# Patient Record
Sex: FEMALE | Race: WHITE | NOT HISPANIC OR LATINO
[De-identification: names, ages, dates, MRNs, and addresses within clinical notes are randomized per-mention and may not be internally consistent; named-entity substitution may affect disease eponyms.]

---

## 2024-02-27 PROBLEM — Z00.00 ENCOUNTER FOR PREVENTIVE HEALTH EXAMINATION: Status: ACTIVE | Noted: 2024-02-27

## 2024-03-25 ENCOUNTER — APPOINTMENT (OUTPATIENT)
Dept: CARDIOLOGY | Facility: CLINIC | Age: 79
End: 2024-03-25
Payer: MEDICARE

## 2024-03-25 VITALS
RESPIRATION RATE: 14 BRPM | WEIGHT: 110 LBS | DIASTOLIC BLOOD PRESSURE: 80 MMHG | BODY MASS INDEX: 20.24 KG/M2 | SYSTOLIC BLOOD PRESSURE: 168 MMHG | HEIGHT: 62 IN | HEART RATE: 85 BPM | OXYGEN SATURATION: 99 %

## 2024-03-25 VITALS — SYSTOLIC BLOOD PRESSURE: 144 MMHG | DIASTOLIC BLOOD PRESSURE: 62 MMHG

## 2024-03-25 DIAGNOSIS — Z78.9 OTHER SPECIFIED HEALTH STATUS: ICD-10-CM

## 2024-03-25 DIAGNOSIS — Z82.49 FAMILY HISTORY OF ISCHEMIC HEART DISEASE AND OTHER DISEASES OF THE CIRCULATORY SYSTEM: ICD-10-CM

## 2024-03-25 DIAGNOSIS — Z86.59 PERSONAL HISTORY OF OTHER MENTAL AND BEHAVIORAL DISORDERS: ICD-10-CM

## 2024-03-25 DIAGNOSIS — E11.9 TYPE 2 DIABETES MELLITUS W/OUT COMPLICATIONS: ICD-10-CM

## 2024-03-25 PROCEDURE — 99205 OFFICE O/P NEW HI 60 MIN: CPT | Mod: 25

## 2024-03-25 PROCEDURE — 93000 ELECTROCARDIOGRAM COMPLETE: CPT

## 2024-03-25 RX ORDER — ASPIRIN 81 MG
81 TABLET, DELAYED RELEASE (ENTERIC COATED) ORAL DAILY
Refills: 0 | Status: ACTIVE | COMMUNITY

## 2024-03-25 RX ORDER — LISINOPRIL 10 MG/1
10 TABLET ORAL DAILY
Refills: 0 | Status: ACTIVE | COMMUNITY

## 2024-03-25 RX ORDER — INSULIN GLARGINE 100 [IU]/ML
100 INJECTION, SOLUTION SUBCUTANEOUS
Refills: 0 | Status: ACTIVE | COMMUNITY

## 2024-03-25 RX ORDER — METFORMIN HYDROCHLORIDE 500 MG/1
500 TABLET, COATED ORAL
Refills: 0 | Status: ACTIVE | COMMUNITY

## 2024-03-25 RX ORDER — ROSUVASTATIN CALCIUM 20 MG/1
20 TABLET, FILM COATED ORAL
Refills: 0 | Status: ACTIVE | COMMUNITY

## 2024-03-25 NOTE — ASSESSMENT
[FreeTextEntry1] : 77 yo F with CAD, HTN, HLD and anxiety presents today for initial cardiac evaluation.

## 2024-03-25 NOTE — CARDIOLOGY SUMMARY
[de-identified] : 3/2524: NSR, HR 80, RBBB, LAD. 2/27/2024: NSR, , RBBB, LAD, small inferior infarct.  [de-identified] : Coronary CTA 8/8/2022: CAC score 734: , LCx 147, .

## 2024-03-25 NOTE — DISCUSSION/SUMMARY
[EKG obtained to assist in diagnosis and management of assessed problem(s)] : EKG obtained to assist in diagnosis and management of assessed problem(s) [FreeTextEntry1] : #CAD - EKG today unchanged from prior baseline RBBB plus LAD and possible old inferior infarct. -Have triple-vessel CAD on her coronary CTA in 2022.  At most moderate obstruction of the LAD.  She has not however had a functional assessment of her coronary disease.  Will obtain a pharmacological nuclear SPECT as echocardiogram for further evaluation. -She and her providers previously had opted for medical management alone. -Continue on ASA 81 mg daily, high-intensity statin rosuvastatin 20 mg daily, and metoprolol 25mg XL daily.   - Last lipid panel: , , HDL 75, LDL 89 on 2/26/24. -Ideally like to opt for lower LDL level around 70.  On above testing, will consider addition of Zetia or increase in rosuvastatin.  #Hypertension - BP today 144/62.  Initial reading 168/60.  She was just started on metoprol 25mg XL daily.  May need to increase this.  HR 85 today.  # DM2 -Is by her PCP.  Continues on metformin 500 mg twice daily. - Last HbA1C 8.5% on 2/26/24 - TSH 1.07  Follow-up in office after above testing.

## 2024-03-25 NOTE — HISTORY OF PRESENT ILLNESS
[FreeTextEntry1] : 79 yo F with CAD, HTN, HLD and anxiety presents today for initial cardiac evaluation.  Patient reports that in 2022 she had a screening coronary CTA.  At the time she was asymptomatic with no chest pain or shortness of breath.  Results returned with a CAC score of734 with triple-vessel CAD resulting in moderate LAD and mild second diagonal branch, mild RCA and mild LCx stenosis.  Reports that she was referred for stress test however never had this done.  She has remained asymptomatic since.  She is now relocating and will be living out with her daughter in [place].  She is established a new PCP in the area recently started her on metoprolol 25 mg XL daily.  She reports that on her feet from 630 to 6 PM without any difficulties.  She can climb a flight of stairs without symptoms.  She denies chest pain, palpitations, dyspnea at rest or exertion, orthopnea, leg swelling, changes in appetite, changes in weight, bendopnea, lightheadedness, dizziness, and syncope/pre-syncope.  Patient is a never smoker.

## 2024-05-06 ENCOUNTER — APPOINTMENT (OUTPATIENT)
Dept: CARDIOLOGY | Facility: CLINIC | Age: 79
End: 2024-05-06
Payer: MEDICARE

## 2024-05-06 PROCEDURE — 93015 CV STRESS TEST SUPVJ I&R: CPT

## 2024-05-06 PROCEDURE — 93306 TTE W/DOPPLER COMPLETE: CPT

## 2024-05-06 PROCEDURE — 78452 HT MUSCLE IMAGE SPECT MULT: CPT

## 2024-05-06 PROCEDURE — 93356 MYOCRD STRAIN IMG SPCKL TRCK: CPT

## 2024-05-06 PROCEDURE — A9502: CPT

## 2024-05-13 ENCOUNTER — APPOINTMENT (OUTPATIENT)
Dept: CARDIOLOGY | Facility: CLINIC | Age: 79
End: 2024-05-13
Payer: MEDICARE

## 2024-05-13 VITALS
BODY MASS INDEX: 20.61 KG/M2 | OXYGEN SATURATION: 98 % | HEART RATE: 91 BPM | SYSTOLIC BLOOD PRESSURE: 160 MMHG | HEIGHT: 62 IN | WEIGHT: 112 LBS | DIASTOLIC BLOOD PRESSURE: 58 MMHG

## 2024-05-13 DIAGNOSIS — E78.5 HYPERLIPIDEMIA, UNSPECIFIED: ICD-10-CM

## 2024-05-13 DIAGNOSIS — R94.31 ABNORMAL ELECTROCARDIOGRAM [ECG] [EKG]: ICD-10-CM

## 2024-05-13 DIAGNOSIS — I10 ESSENTIAL (PRIMARY) HYPERTENSION: ICD-10-CM

## 2024-05-13 DIAGNOSIS — I25.10 ATHEROSCLEROTIC HEART DISEASE OF NATIVE CORONARY ARTERY W/OUT ANGINA PECTORIS: ICD-10-CM

## 2024-05-13 PROCEDURE — 99215 OFFICE O/P EST HI 40 MIN: CPT

## 2024-05-13 RX ORDER — METOPROLOL SUCCINATE 25 MG/1
25 TABLET, EXTENDED RELEASE ORAL
Refills: 0 | Status: ACTIVE | COMMUNITY

## 2024-05-13 NOTE — ASSESSMENT
[FreeTextEntry1] : 79 yo F with CAD, HTN, HLD and anxiety presents today for review of cardiac testing.

## 2024-05-13 NOTE — HISTORY OF PRESENT ILLNESS
[FreeTextEntry1] : 77 yo F with CAD, HTN, HLD and anxiety presents today for review of cardiac testing.  Patient reports that in 2022 she had a screening coronary CTA.  At the time she was asymptomatic with no chest pain or shortness of breath.  Results returned with a CAC score of 734 with triple-vessel CAD resulting in moderate LAD and mild second diagonal branch, mild RCA and mild LCx stenosis.  Reports that she was referred for stress test however never had this done.  She has remained asymptomatic since.  She is now relocating and will be living out with her daughter in [place].  She is established a new PCP in the area recently started her on metoprolol 25 mg XL daily.  I first met her on 3/25/2024.  Nuclear SPECT as well as echocardiogram were ordered.  She is here today to review those results.  Since last visit, she has been feeling well she has no new symptoms. She denies chest pain, palpitations, dyspnea at rest or exertion, orthopnea, leg swelling, changes in appetite, changes in weight, bendopnea, lightheadedness, dizziness, and syncope/pre-syncope. She reports that on her feet from 630 to 6 PM without any difficulties.  She can climb a flight of stairs without symptoms.   Patient is here with her daughter today.  Patient is a never smoker.

## 2024-05-13 NOTE — REASON FOR VISIT
[Symptom and Test Evaluation] : symptom and test evaluation [Other: _____] : [unfilled] [FreeTextEntry1] : PCP: Dr. Everette Ambriz

## 2024-05-13 NOTE — DISCUSSION/SUMMARY
[FreeTextEntry1] : #CAD - EKG today unchanged from prior baseline RBBB plus LAD and possible old inferior infarct. -Have triple-vessel CAD on her coronary CTA in 2022.  At most moderate obstruction of the LAD.  -She and her providers previously had opted for medical management alone. -We proceeded with a functional assessment of her CAD which revealed a small defect in the apical lateral wall.  At this time, she remains asymptomatic.  Will continue with aggressive medical optimization.  They wish to defer catheterization at this time. -Continue on ASA 81 mg daily, high-intensity statin rosuvastatin 20 mg daily, and metoprolol 25mg XL daily.   - Last lipid panel: , , HDL 75, LDL 89 on 2/26/24. - Will aim for a lower LDL target closer to 70.  We discussed Zetia. She would like to think about this.  #Hypertension - BP today 160/58, on repeat 152/64.   - She is on lisinopril 10mg daily and metoprolol 25mg XL daily.  We discussed having a tighter BP control.  They would like to defer uptitrating medications today.  We discussed getting a BP cuff and keeping a log at home.  I will have the nursing team call her 1.5 weeks to review her BP log at home.  May need to adjust further medications.  # DM2 -Is by her PCP.  Continues on metformin 500 mg twice daily. - Last HbA1C 8.5% on 2/26/24 - TSH 1.07  Follow-up with Dr. Patino in July.

## 2024-05-13 NOTE — CARDIOLOGY SUMMARY
[de-identified] : 3/2524: NSR, HR 80, RBBB, LAD. 2/27/2024: NSR, , RBBB, LAD, small inferior infarct.  [de-identified] : Nuclear SPECT 5/6/2024: Small sized mild defect in the apical lateral wall that is reversible suggestive of mild ischemia. [de-identified] : 5/6/2024: LVEF 70%, GLS -24.1%, LVEDD 3.9 cm, IVSd 0.9 cm, trace MR, trace TR, PASP 32 mmHg. [de-identified] : Coronary CTA 8/8/2022: CAC score 734: , LCx 147, .

## 2024-11-16 LAB — HBA1C MFR BLD HPLC: 8.5

## 2024-11-18 ENCOUNTER — APPOINTMENT (OUTPATIENT)
Dept: CARDIOLOGY | Facility: CLINIC | Age: 79
End: 2024-11-18
Payer: MEDICARE

## 2024-11-18 ENCOUNTER — NON-APPOINTMENT (OUTPATIENT)
Age: 79
End: 2024-11-18

## 2024-11-18 VITALS
DIASTOLIC BLOOD PRESSURE: 84 MMHG | WEIGHT: 112 LBS | OXYGEN SATURATION: 98 % | HEART RATE: 105 BPM | SYSTOLIC BLOOD PRESSURE: 182 MMHG | HEIGHT: 62 IN | BODY MASS INDEX: 20.61 KG/M2

## 2024-11-18 DIAGNOSIS — I25.10 ATHEROSCLEROTIC HEART DISEASE OF NATIVE CORONARY ARTERY W/OUT ANGINA PECTORIS: ICD-10-CM

## 2024-11-18 DIAGNOSIS — E11.9 TYPE 2 DIABETES MELLITUS W/OUT COMPLICATIONS: ICD-10-CM

## 2024-11-18 DIAGNOSIS — I10 ESSENTIAL (PRIMARY) HYPERTENSION: ICD-10-CM

## 2024-11-18 DIAGNOSIS — I70.0 ATHEROSCLEROSIS OF AORTA: ICD-10-CM

## 2024-11-18 DIAGNOSIS — E78.5 HYPERLIPIDEMIA, UNSPECIFIED: ICD-10-CM

## 2024-11-18 DIAGNOSIS — R94.31 ABNORMAL ELECTROCARDIOGRAM [ECG] [EKG]: ICD-10-CM

## 2024-11-18 PROCEDURE — 99214 OFFICE O/P EST MOD 30 MIN: CPT

## 2024-11-18 PROCEDURE — 93000 ELECTROCARDIOGRAM COMPLETE: CPT

## 2025-01-06 ENCOUNTER — APPOINTMENT (OUTPATIENT)
Dept: CARDIOLOGY | Facility: CLINIC | Age: 80
End: 2025-01-06

## 2025-03-03 ENCOUNTER — APPOINTMENT (OUTPATIENT)
Dept: CARDIOLOGY | Facility: CLINIC | Age: 80
End: 2025-03-03
Payer: MEDICARE

## 2025-03-03 ENCOUNTER — NON-APPOINTMENT (OUTPATIENT)
Age: 80
End: 2025-03-03

## 2025-03-03 VITALS
HEIGHT: 62 IN | WEIGHT: 114 LBS | OXYGEN SATURATION: 98 % | BODY MASS INDEX: 20.98 KG/M2 | SYSTOLIC BLOOD PRESSURE: 182 MMHG | HEART RATE: 99 BPM | DIASTOLIC BLOOD PRESSURE: 84 MMHG

## 2025-03-03 VITALS — DIASTOLIC BLOOD PRESSURE: 78 MMHG | SYSTOLIC BLOOD PRESSURE: 178 MMHG

## 2025-03-03 DIAGNOSIS — I25.10 ATHEROSCLEROTIC HEART DISEASE OF NATIVE CORONARY ARTERY W/OUT ANGINA PECTORIS: ICD-10-CM

## 2025-03-03 DIAGNOSIS — E11.9 TYPE 2 DIABETES MELLITUS W/OUT COMPLICATIONS: ICD-10-CM

## 2025-03-03 DIAGNOSIS — I70.0 ATHEROSCLEROSIS OF AORTA: ICD-10-CM

## 2025-03-03 DIAGNOSIS — R94.31 ABNORMAL ELECTROCARDIOGRAM [ECG] [EKG]: ICD-10-CM

## 2025-03-03 DIAGNOSIS — E78.5 HYPERLIPIDEMIA, UNSPECIFIED: ICD-10-CM

## 2025-03-03 DIAGNOSIS — I10 ESSENTIAL (PRIMARY) HYPERTENSION: ICD-10-CM

## 2025-03-03 PROCEDURE — 93880 EXTRACRANIAL BILAT STUDY: CPT

## 2025-03-03 PROCEDURE — 99214 OFFICE O/P EST MOD 30 MIN: CPT

## 2025-03-03 PROCEDURE — 93978 VASCULAR STUDY: CPT

## 2025-03-03 RX ORDER — CLOPIDOGREL BISULFATE 75 MG/1
75 TABLET, FILM COATED ORAL
Qty: 90 | Refills: 3 | Status: ACTIVE | COMMUNITY
Start: 2025-03-03 | End: 1900-01-01

## 2025-08-14 ENCOUNTER — APPOINTMENT (OUTPATIENT)
Dept: CARDIOLOGY | Facility: CLINIC | Age: 80
End: 2025-08-14